# Patient Record
Sex: MALE | Race: WHITE
[De-identification: names, ages, dates, MRNs, and addresses within clinical notes are randomized per-mention and may not be internally consistent; named-entity substitution may affect disease eponyms.]

---

## 2018-03-25 ENCOUNTER — HOSPITAL ENCOUNTER (OUTPATIENT)
Dept: HOSPITAL 25 - ED | Age: 33
Setting detail: OBSERVATION
LOS: 1 days | Discharge: HOME | End: 2018-03-26
Attending: UROLOGY | Admitting: HOSPITALIST
Payer: COMMERCIAL

## 2018-03-25 DIAGNOSIS — N13.2: Primary | ICD-10-CM

## 2018-03-25 DIAGNOSIS — J45.909: ICD-10-CM

## 2018-03-25 DIAGNOSIS — Z23: ICD-10-CM

## 2018-03-25 DIAGNOSIS — Z82.49: ICD-10-CM

## 2018-03-25 DIAGNOSIS — F17.210: ICD-10-CM

## 2018-03-25 DIAGNOSIS — Z87.442: ICD-10-CM

## 2018-03-25 PROCEDURE — 80048 BASIC METABOLIC PNL TOTAL CA: CPT

## 2018-03-25 PROCEDURE — 85027 COMPLETE CBC AUTOMATED: CPT

## 2018-03-25 PROCEDURE — 86141 C-REACTIVE PROTEIN HS: CPT

## 2018-03-25 PROCEDURE — 83690 ASSAY OF LIPASE: CPT

## 2018-03-25 PROCEDURE — 99283 EMERGENCY DEPT VISIT LOW MDM: CPT

## 2018-03-25 PROCEDURE — G0378 HOSPITAL OBSERVATION PER HR: HCPCS

## 2018-03-25 PROCEDURE — 90686 IIV4 VACC NO PRSV 0.5 ML IM: CPT

## 2018-03-25 PROCEDURE — 74176 CT ABD & PELVIS W/O CONTRAST: CPT

## 2018-03-25 PROCEDURE — 96376 TX/PRO/DX INJ SAME DRUG ADON: CPT

## 2018-03-25 PROCEDURE — 80053 COMPREHEN METABOLIC PANEL: CPT

## 2018-03-25 PROCEDURE — 81015 MICROSCOPIC EXAM OF URINE: CPT

## 2018-03-25 PROCEDURE — 96374 THER/PROPH/DIAG INJ IV PUSH: CPT

## 2018-03-25 PROCEDURE — 87086 URINE CULTURE/COLONY COUNT: CPT

## 2018-03-25 PROCEDURE — 96375 TX/PRO/DX INJ NEW DRUG ADDON: CPT

## 2018-03-25 PROCEDURE — 81003 URINALYSIS AUTO W/O SCOPE: CPT

## 2018-03-25 PROCEDURE — 74018 RADEX ABDOMEN 1 VIEW: CPT

## 2018-03-25 PROCEDURE — 74420 UROGRAPHY RTRGR +-KUB: CPT

## 2018-03-25 PROCEDURE — 36415 COLL VENOUS BLD VENIPUNCTURE: CPT

## 2018-03-25 PROCEDURE — 85025 COMPLETE CBC W/AUTO DIFF WBC: CPT

## 2018-03-25 PROCEDURE — C1876 STENT, NON-COA/NON-COV W/DEL: HCPCS

## 2018-03-26 VITALS — SYSTOLIC BLOOD PRESSURE: 122 MMHG | DIASTOLIC BLOOD PRESSURE: 75 MMHG

## 2018-03-26 LAB
BASOPHILS # BLD AUTO: 0 10^3/UL (ref 0–0.2)
EOSINOPHIL # BLD AUTO: 0 10^3/UL (ref 0–0.6)
HCT VFR BLD AUTO: 42 % (ref 42–52)
HCT VFR BLD AUTO: 44 % (ref 42–52)
HGB BLD-MCNC: 14.8 G/DL (ref 14–18)
HGB BLD-MCNC: 15.3 G/DL (ref 14–18)
LYMPHOCYTES # BLD AUTO: 1.2 10^3/UL (ref 1–4.8)
MCH RBC QN AUTO: 33 PG (ref 27–31)
MCH RBC QN AUTO: 34 PG (ref 27–31)
MCHC RBC AUTO-ENTMCNC: 35 G/DL (ref 31–36)
MCHC RBC AUTO-ENTMCNC: 36 G/DL (ref 31–36)
MCV RBC AUTO: 94 FL (ref 80–94)
MCV RBC AUTO: 94 FL (ref 80–94)
MONOCYTES # BLD AUTO: 1 10^3/UL (ref 0–0.8)
NEUTROPHILS # BLD AUTO: 14.6 10^3/UL (ref 1.5–7.7)
NRBC # BLD AUTO: 0 10^3/UL
NRBC BLD QL AUTO: 0
PLATELET # BLD AUTO: 198 10^3/UL (ref 150–450)
PLATELET # BLD AUTO: 210 10^3/UL (ref 150–450)
RBC # BLD AUTO: 4.42 10^6/UL (ref 4–5.4)
RBC # BLD AUTO: 4.66 10^6/UL (ref 4–5.4)
RBC UR QL AUTO: (no result)
WBC # BLD AUTO: 10.3 10^3/UL (ref 3.5–10.8)
WBC # BLD AUTO: 16.9 10^3/UL (ref 3.5–10.8)
WBC UR QL AUTO: (no result)

## 2018-03-26 PROCEDURE — 0WHR8YZ INSERTION OF OTHER DEVICE INTO GENITOURINARY TRACT, VIA NATURAL OR ARTIFICIAL OPENING ENDOSCOPIC: ICD-10-PCS | Performed by: UROLOGY

## 2018-03-26 PROCEDURE — BT1FZZZ FLUOROSCOPY OF LEFT KIDNEY, URETER AND BLADDER: ICD-10-PCS | Performed by: UROLOGY

## 2018-03-26 PROCEDURE — 0T778DZ DILATION OF LEFT URETER WITH INTRALUMINAL DEVICE, VIA NATURAL OR ARTIFICIAL OPENING ENDOSCOPIC: ICD-10-PCS | Performed by: UROLOGY

## 2018-03-26 RX ADMIN — SODIUM CHLORIDE SCH MLS/HR: 900 IRRIGANT IRRIGATION at 04:19

## 2018-03-26 RX ADMIN — SODIUM CHLORIDE SCH MLS/HR: 900 IRRIGANT IRRIGATION at 13:00

## 2018-03-26 NOTE — RAD
CPT II Codes: 6045F



INDICATION: Left-sided nephrolithiasis

 

TECHNIQUE: Intraoperative fluoroscopy was provided during retrograde nephrostogram with

stapling is been.



FINDINGS: 



5 spot films depict retrograde nephrostogram showing mild dilatation of the collecting

system followed by anatomic placement of a left ureteral stent.



Fluoroscopy time: 8 seconds



IMPRESSION:  



As above.

## 2018-03-26 NOTE — RAD
INDICATION: Less stent placement



COMPARISON: Same day nephrostogram

 

TECHNIQUE:  A single AP view of the abdomen was obtained



FINDINGS: 



A left ureteral stent is anatomically aligned. There is linear calcification measuring 7

mm adjacent to the proximal loop of the J-tube at the left kidney collecting system.



IMPRESSION: INTERVAL PLACEMENT OF A LEFT-SIDED URETERAL STENT.

## 2018-03-26 NOTE — RAD
CLINICAL HISTORY: Left flank pain, left lower quadrant pain



COMPARISON: None



TECHNIQUE: Multiple contiguous axial CT scans were obtained of the abdomen and pelvis,

without intravenous contrast enhancement. Coronal and sagittal multiplanar reformations

are submitted for review.  Oral contrast was not administered.     



FINDINGS: 

The study is limited by the lack of intravenous contrast. This limits evaluation of the

solid organs and vasculature.





LUNG BASES: The lung bases are clear.



LIVER: The liver is normal in shape, size, contour, and attenuation.

BILE DUCTS: There is no intrahepatic or extrahepatic biliary dilatation.

GALLBLADDER: The gallbladder is normal, without pericholecystic inflammatory change.



PANCREAS: The pancreas is normal, without mass or ductal dilatation.

SPLEEN: Normal in size and appearance.



UPPER GI TRACT: Evaluation of the gastrointestinal tract is limited by incomplete gastric

distention. The upper GI tract is unremarkable.

SMALL BOWEL AND MESENTERY: The small bowel is normal in contour, course, and caliber.

There is no obstruction or dilatation. There is mild stranding of the fat of the mesentery

at the root of small bowel with multiple small mesenteric lymph nodes.

COLON: The colon is normal in contour, course, caliber. There is no pericolonic

inflammatory change.



ADRENALS: Normal bilaterally.

KIDNEYS: There is a 0.9 cm calculus of the left UPJ. There is mild to moderate left

hydronephrosis.

BLADDER: The bladder is smooth in contour.



PELVIC ORGANS: The prostate gland is normal. The seminal vesicles are symmetric.



AORTA: The aorta is normal.

IVC: Unremarkable



LYMPH NODES: As noted above, there are multiple small mesenteric lymph nodes at the root

of the small bowel mesentery. There is no lymphadenopathy by size criteria.



ABDOMINAL WALL: There is no evidence for abdominal wall hernia.

BONES AND SOFT TISSUES: Mild degenerative changes are noted at L4-L5 and L5-S1.

OTHER: None



IMPRESSION:

1.  0.9 CM LEFT UPJ STONE WITH LEFT-SIDED HYDRONEPHROSIS.

2.  MESENTERIC PANNICULITIS.

## 2018-03-26 NOTE — PN
Objective


Active Medications: 








Acetaminophen (Tylenol Supp*)  650 mg MS Q6H PRN


   PRN Reason: FEVER/PAIN


Device (Nicotine Mouth Piece*)  1 each INH .USE WITH NICOTROL PRN


   PRN Reason: CRAVING


Docusate Sodium (Colace Cap*)  200 mg PO BID [facility]


   Last Admin: 03/26/18 09:12 Dose:  Not Given


Sodium Chloride (Ns 0.9% 1000 Ml*)  1,000 mls @ 125 mls/hr IV PER RATE [facility]


   Last Admin: 03/26/18 04:19 Dose:  125 mls/hr


Lorazepam (Ativan Inj*)  0.5 mg IV BEDTIME PRN


   PRN Reason: SLEEP


Morphine Sulfate (Morphine Inj (Syringe)*)  2 mg IV Q2H PRN


   PRN Reason: PAIN - MILD


Nicotine (Nicotine Inhaler*)  10 mg INH Q2H PRN


   PRN Reason: CRAVING


Ondansetron HCl (Zofran Inj*)  4 mg IV Q6H PRN


   PRN Reason: NAUSEA


Pantoprazole Sodium (Protonix Iv*)  40 mg IV DAILY [facility]


   Last Admin: 03/26/18 08:58 Dose:  40 mg








 Vital Signs - 8 hr











  03/26/18 03/26/18 03/26/18





  03:00 03:16 03:30


 


Temperature   


 


Pulse Rate 72  73


 


Respiratory  20 





Rate   


 


Blood Pressure 130/74  113/72





(mmHg)   


 


O2 Sat by Pulse 100  99





Oximetry   














  03/26/18 03/26/18 03/26/18





  03:54 04:43 04:46


 


Temperature 97.9 F 97.5 F 


 


Pulse Rate 70 76 


 


Respiratory 16 16 16





Rate   


 


Blood Pressure 113/72 109/80 





(mmHg)   


 


O2 Sat by Pulse 97 99 





Oximetry   














  03/26/18 03/26/18





  05:32 07:35


 


Temperature  97.6 F


 


Pulse Rate  59


 


Respiratory 16 





Rate  


 


Blood Pressure  





(mmHg)  


 


O2 Sat by Pulse  99





Oximetry  











Oxygen Devices in Use Now: None


Result Diagrams: 


 03/26/18 06:19





 03/26/18 06:19


Additional Lab and Data: 


 Lab Results











  03/26/18 03/26/18 Range/Units





  00:15 00:15 


 


WBC  16.9 H   (3.5-10.8)  10^3/ul


 


RBC  4.66   (4.0-5.4)  10^6/ul


 


Hgb  15.3   (14.0-18.0)  g/dl


 


Hct  44   (42-52)  %


 


MCV  94   (80-94)  fL


 


MCH  33 H   (27-31)  pg


 


MCHC  35   (31-36)  g/dl


 


RDW  13   (10.5-15)  %


 


Plt Count  210   (150-450)  10^3/ul


 


MPV  8.3   (7.4-10.4)  um3


 


Neut % (Auto)  86.4 H   (38-83)  %


 


Lymph % (Auto)  7.3 L   (25-47)  %


 


Mono % (Auto)  6.1   (0-7)  %


 


Eos % (Auto)  0.1   (0-6)  %


 


Baso % (Auto)  0.1   (0-2)  %


 


Absolute Neuts (auto)  14.6 H   (1.5-7.7)  10^3/ul


 


Absolute Lymphs (auto)  1.2   (1.0-4.8)  10^3/ul


 


Absolute Monos (auto)  1.0 H   (0-0.8)  10^3/ul


 


Absolute Eos (auto)  0   (0-0.6)  10^3/ul


 


Absolute Basos (auto)  0   (0-0.2)  10^3/ul


 


Absolute Nucleated RBC  0   10^3/ul


 


Nucleated RBC %  0   


 


Sodium   137  (133-145)  mmol/L


 


Potassium   3.2 L  (3.5-5.0)  mmol/L


 


Chloride   104  (101-111)  mmol/L


 


Carbon Dioxide   25  (22-32)  mmol/L


 


Anion Gap   8  (2-11)  mmol/L


 


BUN   15  (6-24)  mg/dL


 


Creatinine   0.99  (0.67-1.17)  mg/dL


 


Est GFR ( Amer)   112.7  (>60)  


 


Est GFR (Non-Af Amer)   87.6  (>60)  


 


BUN/Creatinine Ratio   15.2  (8-20)  


 


Glucose   120 H  ()  mg/dL


 


Calcium   9.7  (8.6-10.3)  mg/dL


 


Total Bilirubin   0.50  (0.2-1.0)  mg/dL


 


AST   16  (13-39)  U/L


 


ALT   17  (7-52)  U/L


 


Alkaline Phosphatase   66  ()  U/L


 


C-React Prot High Sens   0.98  mg/L


 


Total Protein   7.2  (6.4-8.9)  g/dL


 


Albumin   4.6  (3.2-5.2)  g/dL


 


Globulin   2.6  (2-4)  g/dL


 


Albumin/Globulin Ratio   1.8  (1-3)  


 


Lipase   16  (11.0-82.0)  U/L














Assess/Plan/Problems-Billing


Assessment: 





33 year old male presenting with a 9.5mm obstructing proximal L ureterolith and 

intractable pain.








- Patient Problems


(1) Urolithiasis


Code(s): N20.9 - URINARY CALCULUS, UNSPECIFIED   SNOMED Code(s): 89678940


   Comment: 


 - IVF, pain control, zofran


 - Awaiting recommendations from Urology


 - NPO for now   





(2) Asthma


Code(s): J45.909 - UNSPECIFIED ASTHMA, UNCOMPLICATED   SNOMED Code(s): 954227764


   Comment: 


 - Should stop smoking


 - Albuterol PRN   





(3) Tobacco abuse


Code(s): Z72.0 - TOBACCO USE   SNOMED Code(s): 162295626


   Comment: 


 - Counseled.


 - Nicotine inhaler PRN.   





(4) DVT prophylaxis


Code(s): LHN4328 -    SNOMED Code(s): 014831783


   Comment: 


 - Ambulatory, SCDs if going to OR   





(5) Full code status


Code(s): Z78.9 - OTHER SPECIFIED HEALTH STATUS   SNOMED Code(s): 580673104


   


Status and Disposition: 





Remain inpatient.


Counseling and/or Coordination of Care Minutes: coordinated with patient and 

wife

## 2018-03-26 NOTE — HP
H&P (Free Text)


History and Physical: 





PCP: none





Date/Time: 2018 0250





CC: L flank pain





HPI: Mr Hussein is a 34YO male HX urolithiasis reporting onset of L flank pain 

yesterday waxing & waning becoming severe today and radiating into the left 

abdomen with associated N/V. He denies subjective F/C, black or bloody emesis, B

/U/F of urine, or other issues. Evaluation reveals a 9.5 proximal L nephrolith 

with obstruction.





PMedHx


urolithiasis


asthma


tobacco use disorder





Medications


Nursing to reconcile.





Allergies


No Known Allergies Allergy (Verified 18 21:46)





PSurgHx


tonsillectomy





SocHx: 2.5PPD cigarettes w/ >20 PYHX, rare alcohol, no recreational drugs; 

single, 3 children; unemployed; full code status





FamHx: Mother; alive, 56YO, emphysema, gout, & CAD; Father:  52 2nd CAD 

w/ COPD; 1/2 brothers x2 & 1/2 sisters x2: health status unknown





ROS: as above, otherwise reviewed and all were negative





vitals: 


 Vital Signs











Temp  36.3 C   18 23:46


 


Pulse  72   18 03:00


 


Resp  20   18 03:16


 


BP  130/74   18 03:00


 


Pulse Ox  100   18 03:00








 Intake & Output











 18





 11:59 23:59 11:59


 


Intake Total   1000


 


Balance   1000


 


Weight  90.718 kg 


 


Intake:   


 


  IV Fluids   1000








Constitutional: NAD, normally developed, overweight white male


HEENM: atraumatic; sclera/conjunctiva: anicteric/clear; hearing: clinically 

intact; oropharynx: clear, mucosa moist


Neck: soft tissue: non-tender; thyroid: normal


Pulmonary: clear to auscultation bilaterally, good aeration, no accessory 

muscle use 


CV: RR/RR, normal S1S2, no carotid bruit, no jugular venous distention, 2+ B DP/

PT, no edema


Abdominal: soft, non-distended, non-tender, no rebound/guarding/rigidity, 

normoactive bowel sounds, no hepatosplenomegaly or masses, no costovertebral 

angle tenderness


Musculoskeletal: general: grossly intact, no tenderness w/ palpation


Integumental: normal appearance and texture of exposed skin





Psychiatric 


orientation: AA&O to PPS


affect: calm


mood: cooperative


eye contact: fair


content: reliable


responses: timely


insight: fair





Testing: 


 Lab Results











  18 Range/Units





  00:15 00:15 02:06 


 


WBC  16.9 H    (3.5-10.8)  10^3/ul


 


RBC  4.66    (4.0-5.4)  10^6/ul


 


Hgb  15.3    (14.0-18.0)  g/dl


 


Hct  44    (42-52)  %


 


MCV  94    (80-94)  fL


 


MCH  33 H    (27-31)  pg


 


MCHC  35    (31-36)  g/dl


 


RDW  13    (10.5-15)  %


 


Plt Count  210    (150-450)  10^3/ul


 


MPV  8.3    (7.4-10.4)  um3


 


Neut % (Auto)  86.4 H    (38-83)  %


 


Lymph % (Auto)  7.3 L    (25-47)  %


 


Mono % (Auto)  6.1    (0-7)  %


 


Eos % (Auto)  0.1    (0-6)  %


 


Baso % (Auto)  0.1    (0-2)  %


 


Absolute Neuts (auto)  14.6 H    (1.5-7.7)  10^3/ul


 


Absolute Lymphs (auto)  1.2    (1.0-4.8)  10^3/ul


 


Absolute Monos (auto)  1.0 H    (0-0.8)  10^3/ul


 


Absolute Eos (auto)  0    (0-0.6)  10^3/ul


 


Absolute Basos (auto)  0    (0-0.2)  10^3/ul


 


Absolute Nucleated RBC  0    10^3/ul


 


Nucleated RBC %  0    


 


Sodium   137   (133-145)  mmol/L


 


Potassium   3.2 L   (3.5-5.0)  mmol/L


 


Chloride   104   (101-111)  mmol/L


 


Carbon Dioxide   25   (22-32)  mmol/L


 


Anion Gap   8   (2-11)  mmol/L


 


BUN   15   (6-24)  mg/dL


 


Creatinine   0.99   (0.67-1.17)  mg/dL


 


Est GFR ( Amer)   112.7   (>60)  


 


Est GFR (Non-Af Amer)   87.6   (>60)  


 


BUN/Creatinine Ratio   15.2   (8-20)  


 


Glucose   120 H   ()  mg/dL


 


Calcium   9.7   (8.6-10.3)  mg/dL


 


Total Bilirubin   0.50   (0.2-1.0)  mg/dL


 


AST   16   (13-39)  U/L


 


ALT   17   (7-52)  U/L


 


Alkaline Phosphatase   66   ()  U/L


 


C-React Prot High Sens   0.98   mg/L


 


Total Protein   7.2   (6.4-8.9)  g/dL


 


Albumin   4.6   (3.2-5.2)  g/dL


 


Globulin   2.6   (2-4)  g/dL


 


Albumin/Globulin Ratio   1.8   (1-3)  


 


Lipase   16   (11.0-82.0)  U/L


 


Urine Color    Yellow  


 


Urine Appearance    Cloudy  


 


Urine pH    6.0  (5-9)  


 


Ur Specific Gravity    1.023  (1.010-1.030)  


 


Urine Protein    1+(30 mg/dl) A  (Negative)  


 


Urine Ketones    Trace A  (Negative)  


 


Urine Blood    3+ A  (Negative)  


 


Urine Nitrate    Negative  (Negative)  


 


Urine Bilirubin    Negative  (Negative)  


 


Urine Urobilinogen    Positive A  (Negative)  


 


Ur Leukocyte Esterase    Negative  (Negative)  


 


Urine WBC (Auto)    1+(6-10/hpf) A  (Absent)  


 


Urine RBC (Auto)    3+(>10/hpf) A  (Absent)  


 


Ur Squamous Epith Cells    Present A  (Absent)  


 


Calcium Oxalate Crystal    Present A  (Absent)  


 


Urine Bacteria    Absent  (Absent)  


 


Urine Glucose    Negative  (Negative)  








CT abd/pel WO, personally reviewed: 9.5mm obstructing proximal L ureterolith





Impression: 33M presenting with a 9.5mm obstructing proximal L ureterolith





DIAGNOSIS & PLAN


Primary 


9.5mm obstructing proximal L ureterolith


: IVFs


: pain control


: urology consult in AM





Secondary 


asthma


: albuterol PRN





tobacco use disorder


: cessation advised, low motivation





Admission Rational: observation for ureteral obstruction


DVTp: LONA


Code Status: full

## 2018-03-27 NOTE — OP
DATE OF OPERATION:  03/26/18 - ROOM #420

 

DATE OF BIRTH:  [birth year]

 

SURGEON:  Asad Rowan MD

 

ANESTHESIOLOGIST:  Renny White MD

 

ANESTHESIA:  General.

 

PRE-OP DIAGNOSIS:  Left ureteral calculus.

 

POST-OP DIAGNOSIS:  Left ureteral calculus.

 

OPERATIVE PROCEDURES:

1.  Cystoscopy.

2.  Left retrograde pyelography and placement of left ureteral stent (6-Nauruan).

 

INDICATION FOR PROCEDURE:  Mr. Hussein is a 33-year-old white male who had 
spontaneously passed ureteral calculi in the past.  He presented to the 
emergency room early this morning with symptoms of left renal colic. 
Noncontrast CT of the abdomen and pelvis showed 1 cm calculus at the level of 
the left ureteropelvic junction associated with left hydronephrosis.  No other 
renal calculi were noted and no other abnormalities were seen.

 

Because of the above history, the size of the stone and its location, the above 
procedure was advised and accepted.

 

PATHOLOGY AT CYSTOSCOPY:  The penile and bulbar urethrae looked normal.  The 
prostatic urethra was short and open.  Examination of the bladder showed no 
suspicious bladder lesions.  There were no papillary lesions and no areas to 
suggest carcinoma in situ.  The ureteral orifices looked normal.

 

Upon left retrograde pyelography, there was moderate left hydronephrosis. 



DESCRIPTION OF PROCEDURE:  After successful general anesthesia, the patient was 
placed in the lithotomy position and was prepped and draped for cystoscopy. 
Cystoscopy was performed.  Because of history of chronic smoking, a careful 
cystoscopy was performed and no bladder pathology noted. 

 

A flexible-tip guidewire was then introduced into the left ureter without 
difficulty, and positioned in the area of the renal pelvis.  Size 5-Nauruan open-
ended catheter was fed on top of the guidewire and positioned in the renal 
pelvis and retrograde pyelography was performed.

 

A 6-Nauruan stent was then placed with the proximal end coiling in the renal 
pelvis and the distal end coiling inside the bladder.  There was good drainage 
of contrast from the kidney and no extravasation.

 

The patient tolerated the procedure well and left the operating room in good 
condition.

 

The plan is to obtain a KUB before his discharge.  Definitive treatment of the 
stone will depend upon the KUB findings.

 

 [id number]/CPS #: [id number]

[facility] PartnersDANNY

## 2018-04-03 NOTE — HP
HISTORY AND PHYSICAL:

 

DATE OF PLANNED ADMISSION AND SURGERY:  04/09/18

 

HISTORY OF PRESENT ILLNESS:  Mr. Hussein is a 33-year-old white male who is 
admitted for left renal calculus, status post placement of left ureteral stent 
for shockwave lithotripsy of left renal calculus and possible cystoscopy and 
removal of left ureteral stent.

 

Mr. Hussein has past history of renal calculus disease and he had passed 
stones spontaneously in the past.

 

He was admitted on 03/26/18 with symptoms of left renal colic.  A noncontrast 
CT of the abdomen and pelvis showed 1 cm calculus in the proximal left ureter 
associated with moderate left hydronephrosis.  He was taken to the operating 
room that day and had a cystoscopy and placement of left ureteral stent.  
Postoperative KUB showed the stone to have migrated into the left renal pelvis.

 

The patient has been doing fine since that procedure.  He is now being admitted 
for definitive treatment of the stone.

 

PAST MEDICAL HISTORY AND SYSTEM REVIEW:  He is a chronic heavy smoker.  He 
denies any cardiac or pulmonary diseases or symptoms.  He is on no chronic 
medications and he denies any allergies to medications.

 

                               PHYSICAL EXAMINATION

 

GENERAL:  A pleasant and healthy looking white male.

 

VITAL SIGNS:  Normal.

 

LUNGS:  Clear.

 

HEART:  Regular and rhythmic, no murmurs.

 

ABDOMEN:  Soft, no masses, no tenderness.  Mild left CVA tenderness.

 

EXTERNAL GENITALIA:  He is circumcised, no penile lesions.  Normal testes and 
no inguinal hernias.

 

IMPRESSION:  1 cm calculus in the left renal pelvis. 

Status post placement of left ureteral stent. 

Chronic smoking.

 

PLAN:  Shockwave lithotripsy of the left renal calculus.

 

If there is good fragmentation of the stone, cystoscopy and removal of the left 
ureteral stent will be performed.

 

I discussed the above plan with the patient and all his questions were answered.

 

[id number]/CPS #: [id number]

[place]

## 2018-04-06 NOTE — DS
DISCHARGE SUMMARY:

 

DATE OF ADMISSION:  03/26/18

 

DATE OF DISCHARGE:  03/26/18

 

FINAL DIAGNOSIS:  Left ureteral calculus.

 

OPERATIONS:

1.  Cystoscopy.

2.  Placement of left ureteral stent on 03/26/18.

 

HISTORY:  Mr. Hussein is a 33-year-old male who presented to the emergency 
room early the morning of his admission with symptoms of left renal colic.  He 
had no fever or chills.  Noncontrast CT of the abdomen and pelvis showed 1 cm 
calculus in the proximal left ureter associated with hydronephrosis.

 

PAST MEDICAL HISTORY:  Relevant for spontaneous passage of renal calculi in the 
past.  He has not had any other urological problems.

 

He is a chronic heavy smoker of two and half packs per day.  He is otherwise 
healthy.  He is on no chronic medications and he denies any allergies to 
medications.

 

LABORATORY DATA:  His lab work on admission showed white count of 17,000 with 86
% neutrophils.  His chemistries were normal, in particular the creatinine was 
1. Serum calcium was 9.7.

 

Urinalysis showed +3 blood, +1 protein, +1 esterase and negative otherwise.

 

PHYSICAL EXAMINATION:  Done by Dr. Frankenberg on his admission. 

He was in significant  degree of pain.  His vital signs were normal.  He had 
left flank tenderness.

 

COURSE IN HOSPITAL:  Patient was admitted for IV fluids and pain control.  
Urology consultation was obtained. 

Patient was taken to the operating room that same day and had a cystoscopy and 
placement of left ureteral stent.  

He did very well following the procedure and was discharged home.

 

Postoperative KUB showed the stone to have migrated into the renal pelvis.  
Patient will be followed in my office, and will require shockwave lithotripsy 
and removal of the ureteral stent at a later date.

 

 [id number]/[facility] #: [id number]

[facility] Campus[facility]ANNY

## 2018-04-09 ENCOUNTER — HOSPITAL ENCOUNTER (OUTPATIENT)
Dept: HOSPITAL 25 - OR | Age: 33
Discharge: HOME | End: 2018-04-09
Attending: UROLOGY
Payer: COMMERCIAL

## 2018-04-09 VITALS — SYSTOLIC BLOOD PRESSURE: 129 MMHG | DIASTOLIC BLOOD PRESSURE: 81 MMHG

## 2018-04-09 DIAGNOSIS — N20.0: Primary | ICD-10-CM

## 2018-04-09 DIAGNOSIS — Z72.0: ICD-10-CM

## 2018-04-09 NOTE — OP
DATE OF OPERATION:  04/09/18 [facility]

 

DATE OF BIRTH:  [birth year]

 

SURGEON:  Asad Rowan MD

 

ANESTHESIOLOGIST:  Dr. Eagle Treadwell.

 

ANESTHESIA:  General.

 

PRE-OP DIAGNOSES:

1.  Left renal calculus (1 cm).

2.  Status post placement left uretal stent.

 

POST-OP DIAGNOSES:

1.  Left renal calculus (1 cm).

2.  Status post placement left uretal stent.

 

OPERATIVE PROCEDURE:

1.  Shockwave lithotripsy left renal calculus (1 cm).

2.  Cystoscopy and removal of left ureteral stent.

 

INDICATION FOR PROCEDURE:  Mr. Hussein is a 33-year-old white male who 
presented 2 weeks ago with symptoms of left renal colic and was noted on CT to 
have a 1-cm calculus in the proximal left ureter.  The patient was taken to the 
operating room that same day and had placement of a left ureteral stent.  
Postoperative KUB showed the stone to have migrated into the renal pelvis.

 

The patient did well and he is now being brought in for definitive treatment of 
the stone.

 

PATHOLOGY AT FLUOROSCOPY:  The left ureter stent was in good position.  The 
left renal calculus had migrated into a lower pole calyx.

 

At cystoscopy, the penile and bulbar urethrae looked normal.  The prostatic 
urethra was short and open.  Examination of the bladder showed the expected 
edema in the bladder mucosa adjacent to the left orifice where the stent was 
noted.

 

DESCRIPTION OF PROCEDURE:  After successful general anesthesia, the patient was 
placed in the supine position on the shockwave lithotripsy table.  The calculus 
was visualized in both of the PA and the oblique x-ray views and the position 
of the patient and generator were adjusted to have the stone in the focus of 
the shockwaves.

 

A total of 1,500 shocks were then delivered at a rate of 90 shocks per minute.  
A 3-minutes break was taken after the initial 300 shocks.

 

The proper positioning and fragmentation of the stone were monitored 
periodically.

 

At the completion of the treatment, there was very good fragmentation of the 
stone with the fragments taking the shape of the calyx.

 

Decision was made to proceed with the stent removal.  The patient was kept in 
the supine position and prepped and draped for a flexible cystoscopy.  A 14 red 
rubber catheter was passed inside the bladder and the bladder was emptied and 
the catheter removed.  Flexible cystoscopy performed.  The stent was then 
grabbed and pulled out intact.

 

The patient tolerated the procedure well and left the operating room in good 
condition.

 

 [id number]/[facility] [place] #: [id number]

MTDD
